# Patient Record
Sex: FEMALE | Race: WHITE | NOT HISPANIC OR LATINO | URBAN - METROPOLITAN AREA
[De-identification: names, ages, dates, MRNs, and addresses within clinical notes are randomized per-mention and may not be internally consistent; named-entity substitution may affect disease eponyms.]

---

## 2017-01-04 ENCOUNTER — APPOINTMENT (RX ONLY)
Dept: URBAN - METROPOLITAN AREA TELEMEDICINE 2 | Facility: TELEMEDICINE | Age: 47
Setting detail: DERMATOLOGY
End: 2017-01-04

## 2017-01-04 DIAGNOSIS — Z41.9 ENCOUNTER FOR PROCEDURE FOR PURPOSES OTHER THAN REMEDYING HEALTH STATE, UNSPECIFIED: ICD-10-CM

## 2017-01-04 PROCEDURE — ? COSMETIC CONSULTATION - PHOTOREJUVENATION

## 2017-01-04 ASSESSMENT — LOCATION ZONE DERM: LOCATION ZONE: FACE

## 2017-01-04 ASSESSMENT — LOCATION SIMPLE DESCRIPTION DERM: LOCATION SIMPLE: LEFT CHEEK

## 2017-01-04 ASSESSMENT — LOCATION DETAILED DESCRIPTION DERM: LOCATION DETAILED: LEFT INFERIOR CENTRAL MALAR CHEEK

## 2017-01-04 NOTE — HPI: FACE (AGING FACE)
How Severe Is It?: moderate
Is This A New Presentation, Or A Follow-Up?: Aging Face
Additional History: No autoimmune,hiv,hsv,hepatitis,diabetes,pregnant or breast feeding

## 2017-02-23 ENCOUNTER — APPOINTMENT (RX ONLY)
Dept: URBAN - METROPOLITAN AREA TELEMEDICINE 2 | Facility: TELEMEDICINE | Age: 47
Setting detail: DERMATOLOGY
End: 2017-02-23

## 2017-02-23 DIAGNOSIS — Z41.9 ENCOUNTER FOR PROCEDURE FOR PURPOSES OTHER THAN REMEDYING HEALTH STATE, UNSPECIFIED: ICD-10-CM

## 2017-02-23 PROCEDURE — ? SCITON BBL

## 2017-02-23 ASSESSMENT — LOCATION SIMPLE DESCRIPTION DERM: LOCATION SIMPLE: LEFT CHEEK

## 2017-02-23 ASSESSMENT — LOCATION DETAILED DESCRIPTION DERM: LOCATION DETAILED: LEFT INFERIOR CENTRAL MALAR CHEEK

## 2017-02-23 ASSESSMENT — LOCATION ZONE DERM: LOCATION ZONE: FACE

## 2017-02-23 NOTE — PROCEDURE: SCITON BBL
Fluence (J/Cm2): 9
Cooling (In C): 15
Repetition Rate (Hz): 10
Spot Size: Finesse Adapter Size: 15 x 15 mm square
Pulse Duration Units: milliseconds
Fluence (J/Cm2): 8
Passes: 1
Anesthesia Volume In Cc: 0
Fluence (J/Cm2): 25
Spot Size: Finesse Adapter Size: 7 mm round
Fluence (J/Cm2): 20
Cooling ?: Yes
Spot Size: Finesse Adapter Size: 15 x 45 mm (No Finesse Adapter)
Consent: Written consent obtained, risks reviewed including but not limited to crusting, scabbing, blistering, scarring, darker or lighter pigmentary change, bruising, and/or incomplete response.
Detail Level: Zone
Post Procedure Text: The patient tolerated the procedure well. Ice-chilled washclothes were applied to the treatment area for comfort. Post care was reviewd with the paitent.
Preprocedure Text: The treatment areas were thoroughly cleaned. Any exposed at risk hair that was not to be treated was covered in white paper tape. Clear ultrasound gel was applied to the treatment area. The area was treated with no immediate stacking of pulses.
Post-Care Instructions: I reviewed with the patient in detail post-care instructions. Patient should stay away from the sun and wear sun protection until treated areas are fully healed.

## 2020-12-14 ENCOUNTER — APPOINTMENT (RX ONLY)
Dept: URBAN - METROPOLITAN AREA TELEMEDICINE 2 | Facility: TELEMEDICINE | Age: 50
Setting detail: DERMATOLOGY
End: 2020-12-14

## 2020-12-14 DIAGNOSIS — H01.13 ECZEMATOUS DERMATITIS OF EYELID: ICD-10-CM | Status: INADEQUATELY CONTROLLED

## 2020-12-14 DIAGNOSIS — Z41.9 ENCOUNTER FOR PROCEDURE FOR PURPOSES OTHER THAN REMEDYING HEALTH STATE, UNSPECIFIED: ICD-10-CM

## 2020-12-14 PROBLEM — H01.139 ECZEMATOUS DERMATITIS OF UNSPECIFIED EYE, UNSPECIFIED EYELID: Status: ACTIVE | Noted: 2020-12-14

## 2020-12-14 PROBLEM — H01.134 ECZEMATOUS DERMATITIS OF LEFT UPPER EYELID: Status: ACTIVE | Noted: 2020-12-14

## 2020-12-14 PROBLEM — H01.131 ECZEMATOUS DERMATITIS OF RIGHT UPPER EYELID: Status: ACTIVE | Noted: 2020-12-14

## 2020-12-14 PROCEDURE — ? COSMETIC CONSULTATION: SKIN CARE PRODUCTS AND SERVICES

## 2020-12-14 PROCEDURE — ? PATIENT SPECIFIC COUNSELING

## 2020-12-14 PROCEDURE — ? TREATMENT REGIMEN

## 2020-12-14 PROCEDURE — ? PRESCRIPTION

## 2020-12-14 PROCEDURE — 99202 OFFICE O/P NEW SF 15 MIN: CPT

## 2020-12-14 PROCEDURE — ? COUNSELING

## 2020-12-14 RX ORDER — EMOLLIENT COMBINATION NO.35
THIN LAYER CREAM (GRAM) TOPICAL TWICE DAILY
Qty: 1 | Refills: 2 | Status: ERX | COMMUNITY
Start: 2020-12-14

## 2020-12-14 RX ORDER — CLOCORTOLONE PIVALATE 0 G/G
THIN LAYER CREAM TOPICAL BID
Qty: 1 | Refills: 0 | Status: ERX | COMMUNITY
Start: 2020-12-14

## 2020-12-14 RX ADMIN — Medication THIN LAYER: at 00:00

## 2020-12-14 RX ADMIN — CLOCORTOLONE PIVALATE THIN LAYER: 0 CREAM TOPICAL at 00:00

## 2020-12-14 ASSESSMENT — LOCATION DETAILED DESCRIPTION DERM
LOCATION DETAILED: LEFT MEDIAL SUPERIOR EYELID
LOCATION DETAILED: RIGHT SUPERIOR MEDIAL MALAR CHEEK
LOCATION DETAILED: RIGHT MEDIAL SUPERIOR EYELID
LOCATION DETAILED: RIGHT SUPERIOR MEDIAL MALAR CHEEK
LOCATION DETAILED: LEFT SUPERIOR MEDIAL MALAR CHEEK

## 2020-12-14 ASSESSMENT — LOCATION ZONE DERM
LOCATION ZONE: EYELID
LOCATION ZONE: FACE
LOCATION ZONE: FACE

## 2020-12-14 ASSESSMENT — SEVERITY ASSESSMENT: SEVERITY: MODERATE

## 2020-12-14 ASSESSMENT — LOCATION SIMPLE DESCRIPTION DERM
LOCATION SIMPLE: LEFT SUPERIOR EYELID
LOCATION SIMPLE: LEFT CHEEK
LOCATION SIMPLE: RIGHT CHEEK
LOCATION SIMPLE: RIGHT SUPERIOR EYELID
LOCATION SIMPLE: RIGHT CHEEK

## 2020-12-14 NOTE — PROCEDURE: TREATMENT REGIMEN
Detail Level: Zone
Plan: Consider patch testing at follow up if still flaring
Samples Given: Cloderm given \\nMimyx given
Initiate Treatment: Cloderm twice daily\\nMimyx twice daily for maintenance

## 2021-01-04 ENCOUNTER — APPOINTMENT (RX ONLY)
Dept: URBAN - METROPOLITAN AREA TELEMEDICINE 2 | Facility: TELEMEDICINE | Age: 51
Setting detail: DERMATOLOGY
End: 2021-01-04

## 2021-01-04 DIAGNOSIS — H01.13 ECZEMATOUS DERMATITIS OF EYELID: ICD-10-CM

## 2021-01-04 DIAGNOSIS — L90.8 OTHER ATROPHIC DISORDERS OF SKIN: ICD-10-CM

## 2021-01-04 DIAGNOSIS — L57.3 POIKILODERMA OF CIVATTE: ICD-10-CM | Status: WORSENING

## 2021-01-04 PROBLEM — H01.139 ECZEMATOUS DERMATITIS OF UNSPECIFIED EYE, UNSPECIFIED EYELID: Status: ACTIVE | Noted: 2021-01-04

## 2021-01-04 PROBLEM — H01.134 ECZEMATOUS DERMATITIS OF LEFT UPPER EYELID: Status: ACTIVE | Noted: 2021-01-04

## 2021-01-04 PROBLEM — H01.131 ECZEMATOUS DERMATITIS OF RIGHT UPPER EYELID: Status: ACTIVE | Noted: 2021-01-04

## 2021-01-04 PROCEDURE — ? PATIENT SPECIFIC COUNSELING

## 2021-01-04 PROCEDURE — ? COUNSELING

## 2021-01-04 PROCEDURE — 99213 OFFICE O/P EST LOW 20 MIN: CPT

## 2021-01-04 PROCEDURE — ? RECOMMENDATIONS

## 2021-01-04 PROCEDURE — ? TREATMENT REGIMEN

## 2021-01-04 ASSESSMENT — LOCATION SIMPLE DESCRIPTION DERM
LOCATION SIMPLE: LEFT CHEEK
LOCATION SIMPLE: RIGHT CHEEK
LOCATION SIMPLE: LEFT SUPERIOR EYELID
LOCATION SIMPLE: RIGHT SUPERIOR EYELID
LOCATION SIMPLE: RIGHT ANTERIOR NECK

## 2021-01-04 ASSESSMENT — LOCATION ZONE DERM
LOCATION ZONE: FACE
LOCATION ZONE: NECK
LOCATION ZONE: EYELID

## 2021-01-04 ASSESSMENT — LOCATION DETAILED DESCRIPTION DERM
LOCATION DETAILED: RIGHT SUPERIOR MEDIAL MALAR CHEEK
LOCATION DETAILED: RIGHT INFERIOR ANTERIOR NECK
LOCATION DETAILED: RIGHT MEDIAL SUPERIOR EYELID
LOCATION DETAILED: LEFT SUPERIOR MEDIAL MALAR CHEEK
LOCATION DETAILED: LEFT MEDIAL SUPERIOR EYELID

## 2021-01-04 NOTE — PROCEDURE: TREATMENT REGIMEN
Detail Level: Zone
Plan: Consider patch testing at follow up if still flaring, Patient defers today \\nRecommended moisturizer over the top if dry
Continue Regimen: Cloderm twice daily for one week max per month prn\\nMimyx twice daily every day for maintenance
Otc Regimen: SPF daily

## 2021-01-04 NOTE — PROCEDURE: RECOMMENDATIONS
Recommendations (Free Text): Profrac tx with Osvaldo (okay per Angie once eyelid derm calm)
Detail Level: Simple
Render Risk Assessment In Note?: no
Recommendation Preamble: The following recommendations were made during the visit:

## 2024-04-01 ENCOUNTER — APPOINTMENT (RX ONLY)
Dept: URBAN - METROPOLITAN AREA TELEMEDICINE 2 | Facility: TELEMEDICINE | Age: 54
Setting detail: DERMATOLOGY
End: 2024-04-01

## 2024-04-01 DIAGNOSIS — D18.0 HEMANGIOMA: ICD-10-CM

## 2024-04-01 DIAGNOSIS — Q80.8 OTHER CONGENITAL ICHTHYOSIS: ICD-10-CM

## 2024-04-01 DIAGNOSIS — Q828 OTHER SPECIFIED ANOMALIES OF SKIN: ICD-10-CM

## 2024-04-01 DIAGNOSIS — I78.1 NEVUS, NON-NEOPLASTIC: ICD-10-CM

## 2024-04-01 DIAGNOSIS — L40.0 PSORIASIS VULGARIS: ICD-10-CM | Status: INADEQUATELY CONTROLLED

## 2024-04-01 DIAGNOSIS — L57.3 POIKILODERMA OF CIVATTE: ICD-10-CM

## 2024-04-01 DIAGNOSIS — D22 MELANOCYTIC NEVI: ICD-10-CM

## 2024-04-01 DIAGNOSIS — Z71.89 OTHER SPECIFIED COUNSELING: ICD-10-CM

## 2024-04-01 DIAGNOSIS — Q826 OTHER SPECIFIED ANOMALIES OF SKIN: ICD-10-CM

## 2024-04-01 DIAGNOSIS — L81.4 OTHER MELANIN HYPERPIGMENTATION: ICD-10-CM

## 2024-04-01 DIAGNOSIS — L82.1 OTHER SEBORRHEIC KERATOSIS: ICD-10-CM

## 2024-04-01 DIAGNOSIS — D485 NEOPLASM OF UNCERTAIN BEHAVIOR OF SKIN: ICD-10-CM

## 2024-04-01 DIAGNOSIS — Q819 OTHER SPECIFIED ANOMALIES OF SKIN: ICD-10-CM

## 2024-04-01 DIAGNOSIS — L57.0 ACTINIC KERATOSIS: ICD-10-CM

## 2024-04-01 PROBLEM — D22.71 MELANOCYTIC NEVI OF RIGHT LOWER LIMB, INCLUDING HIP: Status: ACTIVE | Noted: 2024-04-01

## 2024-04-01 PROBLEM — D22.72 MELANOCYTIC NEVI OF LEFT LOWER LIMB, INCLUDING HIP: Status: ACTIVE | Noted: 2024-04-01

## 2024-04-01 PROBLEM — D48.5 NEOPLASM OF UNCERTAIN BEHAVIOR OF SKIN: Status: ACTIVE | Noted: 2024-04-01

## 2024-04-01 PROBLEM — D23.71 OTHER BENIGN NEOPLASM OF SKIN OF RIGHT LOWER LIMB, INCLUDING HIP: Status: ACTIVE | Noted: 2024-04-01

## 2024-04-01 PROBLEM — D18.01 HEMANGIOMA OF SKIN AND SUBCUTANEOUS TISSUE: Status: ACTIVE | Noted: 2024-04-01

## 2024-04-01 PROBLEM — D22.39 MELANOCYTIC NEVI OF OTHER PARTS OF FACE: Status: ACTIVE | Noted: 2024-04-01

## 2024-04-01 PROBLEM — L85.8 OTHER SPECIFIED EPIDERMAL THICKENING: Status: ACTIVE | Noted: 2024-04-01

## 2024-04-01 PROBLEM — D22.5 MELANOCYTIC NEVI OF TRUNK: Status: ACTIVE | Noted: 2024-04-01

## 2024-04-01 PROCEDURE — ? LIQUID NITROGEN

## 2024-04-01 PROCEDURE — ? PRESCRIPTION

## 2024-04-01 PROCEDURE — ? BIOPSY BY SHAVE METHOD

## 2024-04-01 PROCEDURE — 11103 TANGNTL BX SKIN EA SEP/ADDL: CPT

## 2024-04-01 PROCEDURE — ? PRESCRIPTION MEDICATION MANAGEMENT

## 2024-04-01 PROCEDURE — 11102 TANGNTL BX SKIN SINGLE LES: CPT

## 2024-04-01 PROCEDURE — ? COUNSELING

## 2024-04-01 PROCEDURE — 99204 OFFICE O/P NEW MOD 45 MIN: CPT | Mod: 25

## 2024-04-01 PROCEDURE — 17000 DESTRUCT PREMALG LESION: CPT | Mod: 59

## 2024-04-01 RX ORDER — ROFLUMILAST 3 MG/G
THIN LAYER CREAM TOPICAL
Qty: 60 | Refills: 3 | Status: ERX | COMMUNITY
Start: 2024-04-01

## 2024-04-01 RX ADMIN — ROFLUMILAST THIN LAYER: 3 CREAM TOPICAL at 00:00

## 2024-04-01 ASSESSMENT — LOCATION ZONE DERM
LOCATION ZONE: NECK
LOCATION ZONE: EYELID
LOCATION ZONE: ARM
LOCATION ZONE: LEG
LOCATION ZONE: FACE
LOCATION ZONE: TRUNK

## 2024-04-01 ASSESSMENT — LOCATION SIMPLE DESCRIPTION DERM
LOCATION SIMPLE: RIGHT UPPER BACK
LOCATION SIMPLE: RIGHT POSTERIOR UPPER ARM
LOCATION SIMPLE: LEFT ZYGOMA
LOCATION SIMPLE: RIGHT BUTTOCK
LOCATION SIMPLE: RIGHT CHEEK
LOCATION SIMPLE: RIGHT ANKLE
LOCATION SIMPLE: RIGHT SUPERIOR EYELID
LOCATION SIMPLE: LEFT BUTTOCK
LOCATION SIMPLE: LEFT THIGH
LOCATION SIMPLE: LEFT CALF
LOCATION SIMPLE: LEFT FOREARM
LOCATION SIMPLE: RIGHT FOREARM
LOCATION SIMPLE: RIGHT ANTERIOR NECK
LOCATION SIMPLE: RIGHT TEMPLE
LOCATION SIMPLE: LEFT POSTERIOR UPPER ARM
LOCATION SIMPLE: ABDOMEN
LOCATION SIMPLE: RIGHT THIGH
LOCATION SIMPLE: LEFT POSTERIOR THIGH
LOCATION SIMPLE: RIGHT CALF
LOCATION SIMPLE: LEFT CHEEK
LOCATION SIMPLE: LEFT SUPERIOR EYELID
LOCATION SIMPLE: POSTERIOR NECK
LOCATION SIMPLE: CHEST
LOCATION SIMPLE: LEFT UPPER BACK
LOCATION SIMPLE: UPPER BACK
LOCATION SIMPLE: RIGHT POSTERIOR THIGH

## 2024-04-01 ASSESSMENT — LOCATION DETAILED DESCRIPTION DERM
LOCATION DETAILED: RIGHT SUPERIOR MEDIAL MALAR CHEEK
LOCATION DETAILED: LEFT SUPERIOR MEDIAL UPPER BACK
LOCATION DETAILED: RIGHT PROXIMAL POSTERIOR THIGH
LOCATION DETAILED: LEFT PROXIMAL POSTERIOR THIGH
LOCATION DETAILED: LEFT ANTERIOR PROXIMAL THIGH
LOCATION DETAILED: RIGHT INFERIOR LATERAL MALAR CHEEK
LOCATION DETAILED: RIGHT BUTTOCK
LOCATION DETAILED: LEFT LATERAL ZYGOMA
LOCATION DETAILED: RIGHT MEDIAL SUPERIOR EYELID
LOCATION DETAILED: LEFT PROXIMAL DORSAL FOREARM
LOCATION DETAILED: LEFT DISTAL POSTERIOR THIGH
LOCATION DETAILED: LEFT PROXIMAL POSTERIOR UPPER ARM
LOCATION DETAILED: RIGHT POSTERIOR ANKLE
LOCATION DETAILED: RIGHT LATERAL ABDOMEN
LOCATION DETAILED: LEFT MEDIAL MALAR CHEEK
LOCATION DETAILED: LEFT BUTTOCK
LOCATION DETAILED: LEFT DISTAL CALF
LOCATION DETAILED: RIGHT INFERIOR ANTERIOR NECK
LOCATION DETAILED: RIGHT LATERAL TEMPLE
LOCATION DETAILED: LEFT SUPERIOR MEDIAL MALAR CHEEK
LOCATION DETAILED: RIGHT SUPERIOR MEDIAL UPPER BACK
LOCATION DETAILED: LEFT MEDIAL SUPERIOR EYELID
LOCATION DETAILED: UPPER STERNUM
LOCATION DETAILED: RIGHT MEDIAL TRAPEZIAL NECK
LOCATION DETAILED: RIGHT DISTAL POSTERIOR UPPER ARM
LOCATION DETAILED: EPIGASTRIC SKIN
LOCATION DETAILED: LEFT MID-UPPER BACK
LOCATION DETAILED: RIGHT PROXIMAL DORSAL FOREARM
LOCATION DETAILED: RIGHT PROXIMAL POSTERIOR UPPER ARM
LOCATION DETAILED: MIDDLE STERNUM
LOCATION DETAILED: RIGHT DISTAL CALF
LOCATION DETAILED: INFERIOR THORACIC SPINE
LOCATION DETAILED: LEFT RIB CAGE
LOCATION DETAILED: LEFT DISTAL POSTERIOR UPPER ARM
LOCATION DETAILED: LOWER STERNUM
LOCATION DETAILED: RIGHT ANTERIOR PROXIMAL THIGH

## 2024-04-01 ASSESSMENT — PGA PSORIASIS: PGA PSORIASIS 2020: MODERATE

## 2024-04-01 ASSESSMENT — ITCH NUMERIC RATING SCALE: HOW SEVERE IS YOUR ITCHING?: 2

## 2024-04-01 ASSESSMENT — BSA PSORIASIS: % BODY COVERED IN PSORIASIS: 1

## 2024-04-01 NOTE — PROCEDURE: BIOPSY BY SHAVE METHOD
Detail Level: Detailed
Depth Of Biopsy: dermis
Was A Bandage Applied: Yes
Size Of Lesion In Cm: 0
Biopsy Type: H and E
Biopsy Method: Dermablade
Anesthesia Type: 1% lidocaine with epinephrine and a 1:10 solution of 8.4% sodium bicarbonate
Anesthesia Volume In Cc: 0.5
Hemostasis: Drysol
Wound Care: Vaseline
Dressing: bandage
Destruction After The Procedure: No
Type Of Destruction Used: Curettage
Curettage Text: The wound bed was treated with curettage after the biopsy was performed.
Cryotherapy Text: The wound bed was treated with cryotherapy after the biopsy was performed.
Electrodesiccation Text: The wound bed was treated with electrodesiccation after the biopsy was performed.
Electrodesiccation And Curettage Text: The wound bed was treated with electrodesiccation and curettage after the biopsy was performed.
Silver Nitrate Text: The wound bed was treated with silver nitrate after the biopsy was performed.
Lab: [id number]
Lab Facility: [id number]
Consent: Written consent was obtained and risks were reviewed including but not limited to scarring, infection, bleeding, scabbing, incomplete removal, nerve damage and allergy to anesthesia.
Post-Care Instructions: I reviewed with the patient in detail post-care instructions. Patient is to keep the biopsy site dry overnight, and then apply bacitracin twice daily until healed. Patient may apply hydrogen peroxide soaks to remove any crusting.
Notification Instructions: Patient will be notified of biopsy results. However, patient instructed to call the office if not contacted within 2 weeks.
Billing Type: Third-Party Bill
Information: Selecting Yes will display possible errors in your note based on the variables you have selected. This validation is only offered as a suggestion for you. PLEASE NOTE THAT THE VALIDATION TEXT WILL BE REMOVED WHEN YOU FINALIZE YOUR NOTE. IF YOU WANT TO FAX A PRELIMINARY NOTE YOU WILL NEED TO TOGGLE THIS TO 'NO' IF YOU DO NOT WANT IT IN YOUR FAXED NOTE.

## 2024-04-01 NOTE — PROCEDURE: LIQUID NITROGEN
Render Post-Care Instructions In Note?: yes
Consent: The patient's consent was obtained including but not limited to risks of crusting, scabbing, blistering, scarring, darker or lighter pigmentary change, recurrence, incomplete removal and infection.
Detail Level: Zone
Number Of Freeze-Thaw Cycles: 3 freeze-thaw cycles
Render Note In Bullet Format When Appropriate: No
Post-Care Instructions: I reviewed with the patient in detail post-care instructions. Patient is to wear sunprotection, and avoid picking at any of the treated lesions. Pt may apply Vaseline to crusted or scabbing areas.
Duration Of Freeze Thaw-Cycle (Seconds): 5

## 2024-04-01 NOTE — PROCEDURE: PRESCRIPTION MEDICATION MANAGEMENT
Samples Given: Zoryve
Detail Level: Zone
Render In Strict Bullet Format?: No
Initiate Treatment: Zoryve ( [place])

## 2025-04-28 ENCOUNTER — APPOINTMENT (OUTPATIENT)
Dept: URBAN - METROPOLITAN AREA TELEMEDICINE 2 | Facility: TELEMEDICINE | Age: 55
Setting detail: DERMATOLOGY
End: 2025-04-28

## 2025-04-28 DIAGNOSIS — D22 MELANOCYTIC NEVI: ICD-10-CM

## 2025-04-28 DIAGNOSIS — I78.1 NEVUS, NON-NEOPLASTIC: ICD-10-CM

## 2025-04-28 DIAGNOSIS — Z71.89 OTHER SPECIFIED COUNSELING: ICD-10-CM

## 2025-04-28 DIAGNOSIS — D18.0 HEMANGIOMA: ICD-10-CM

## 2025-04-28 DIAGNOSIS — L81.4 OTHER MELANIN HYPERPIGMENTATION: ICD-10-CM

## 2025-04-28 DIAGNOSIS — Q819 OTHER SPECIFIED ANOMALIES OF SKIN: ICD-10-CM

## 2025-04-28 DIAGNOSIS — Q826 OTHER SPECIFIED ANOMALIES OF SKIN: ICD-10-CM

## 2025-04-28 DIAGNOSIS — Q828 OTHER SPECIFIED ANOMALIES OF SKIN: ICD-10-CM

## 2025-04-28 DIAGNOSIS — L40.0 PSORIASIS VULGARIS: ICD-10-CM | Status: INADEQUATELY CONTROLLED

## 2025-04-28 DIAGNOSIS — Q80.8 OTHER CONGENITAL ICHTHYOSIS: ICD-10-CM

## 2025-04-28 DIAGNOSIS — L57.0 ACTINIC KERATOSIS: ICD-10-CM | Status: INADEQUATELY CONTROLLED

## 2025-04-28 DIAGNOSIS — L57.3 POIKILODERMA OF CIVATTE: ICD-10-CM

## 2025-04-28 DIAGNOSIS — L82.1 OTHER SEBORRHEIC KERATOSIS: ICD-10-CM

## 2025-04-28 PROBLEM — D22.72 MELANOCYTIC NEVI OF LEFT LOWER LIMB, INCLUDING HIP: Status: ACTIVE | Noted: 2025-04-28

## 2025-04-28 PROBLEM — D22.39 MELANOCYTIC NEVI OF OTHER PARTS OF FACE: Status: ACTIVE | Noted: 2025-04-28

## 2025-04-28 PROBLEM — D23.71 OTHER BENIGN NEOPLASM OF SKIN OF RIGHT LOWER LIMB, INCLUDING HIP: Status: ACTIVE | Noted: 2025-04-28

## 2025-04-28 PROBLEM — D22.5 MELANOCYTIC NEVI OF TRUNK: Status: ACTIVE | Noted: 2025-04-28

## 2025-04-28 PROBLEM — L85.8 OTHER SPECIFIED EPIDERMAL THICKENING: Status: ACTIVE | Noted: 2025-04-28

## 2025-04-28 PROBLEM — D18.01 HEMANGIOMA OF SKIN AND SUBCUTANEOUS TISSUE: Status: ACTIVE | Noted: 2025-04-28

## 2025-04-28 PROBLEM — D22.71 MELANOCYTIC NEVI OF RIGHT LOWER LIMB, INCLUDING HIP: Status: ACTIVE | Noted: 2025-04-28

## 2025-04-28 PROCEDURE — 17003 DESTRUCT PREMALG LES 2-14: CPT

## 2025-04-28 PROCEDURE — 99214 OFFICE O/P EST MOD 30 MIN: CPT | Mod: 25

## 2025-04-28 PROCEDURE — ? PRESCRIPTION

## 2025-04-28 PROCEDURE — ? SUNSCREEN RECOMMENDATIONS

## 2025-04-28 PROCEDURE — ? LIQUID NITROGEN

## 2025-04-28 PROCEDURE — 17000 DESTRUCT PREMALG LESION: CPT

## 2025-04-28 PROCEDURE — ? COUNSELING

## 2025-04-28 PROCEDURE — ? PRESCRIPTION MEDICATION MANAGEMENT

## 2025-04-28 RX ORDER — TAPINAROF 10 MG/1000MG
CREAM TOPICAL QD
Qty: 60 | Refills: 2 | Status: ERX | COMMUNITY
Start: 2025-04-28

## 2025-04-28 RX ADMIN — TAPINAROF: 10 CREAM TOPICAL at 00:00

## 2025-04-28 ASSESSMENT — LOCATION SIMPLE DESCRIPTION DERM
LOCATION SIMPLE: RIGHT TEMPLE
LOCATION SIMPLE: LEFT ZYGOMA
LOCATION SIMPLE: LEFT FOREARM
LOCATION SIMPLE: LEFT SUPERIOR EYELID
LOCATION SIMPLE: RIGHT FOREARM
LOCATION SIMPLE: LEFT UPPER BACK
LOCATION SIMPLE: LEFT CALF
LOCATION SIMPLE: CHEST
LOCATION SIMPLE: RIGHT ANKLE
LOCATION SIMPLE: RIGHT BUTTOCK
LOCATION SIMPLE: RIGHT ANTERIOR NECK
LOCATION SIMPLE: UPPER BACK
LOCATION SIMPLE: ABDOMEN
LOCATION SIMPLE: RIGHT POSTERIOR UPPER ARM
LOCATION SIMPLE: RIGHT SUPERIOR EYELID
LOCATION SIMPLE: RIGHT UPPER BACK
LOCATION SIMPLE: RIGHT CALF
LOCATION SIMPLE: RIGHT POSTERIOR THIGH
LOCATION SIMPLE: LEFT CHEEK
LOCATION SIMPLE: RIGHT CHEEK
LOCATION SIMPLE: LEFT POSTERIOR UPPER ARM
LOCATION SIMPLE: LEFT CLAVICULAR SKIN
LOCATION SIMPLE: RIGHT THIGH
LOCATION SIMPLE: LEFT POSTERIOR THIGH
LOCATION SIMPLE: LEFT BUTTOCK

## 2025-04-28 ASSESSMENT — LOCATION DETAILED DESCRIPTION DERM
LOCATION DETAILED: LEFT DISTAL POSTERIOR UPPER ARM
LOCATION DETAILED: RIGHT LATERAL ABDOMEN
LOCATION DETAILED: LEFT LATERAL ZYGOMA
LOCATION DETAILED: RIGHT SUPERIOR MEDIAL UPPER BACK
LOCATION DETAILED: LOWER STERNUM
LOCATION DETAILED: LEFT SUPERIOR MEDIAL UPPER BACK
LOCATION DETAILED: LEFT PROXIMAL POSTERIOR UPPER ARM
LOCATION DETAILED: LEFT BUTTOCK
LOCATION DETAILED: RIGHT LATERAL TEMPLE
LOCATION DETAILED: EPIGASTRIC SKIN
LOCATION DETAILED: RIGHT MEDIAL SUPERIOR EYELID
LOCATION DETAILED: RIGHT INFERIOR ANTERIOR NECK
LOCATION DETAILED: RIGHT BUTTOCK
LOCATION DETAILED: RIGHT DISTAL DORSAL FOREARM
LOCATION DETAILED: LEFT RIB CAGE
LOCATION DETAILED: LEFT MEDIAL MALAR CHEEK
LOCATION DETAILED: RIGHT SUPERIOR MEDIAL MALAR CHEEK
LOCATION DETAILED: RIGHT PROXIMAL DORSAL FOREARM
LOCATION DETAILED: MIDDLE STERNUM
LOCATION DETAILED: LEFT CLAVICULAR SKIN
LOCATION DETAILED: RIGHT PROXIMAL POSTERIOR THIGH
LOCATION DETAILED: RIGHT POSTERIOR ANKLE
LOCATION DETAILED: LEFT SUPERIOR MEDIAL MALAR CHEEK
LOCATION DETAILED: UPPER STERNUM
LOCATION DETAILED: LEFT MID-UPPER BACK
LOCATION DETAILED: RIGHT DISTAL POSTERIOR UPPER ARM
LOCATION DETAILED: INFERIOR THORACIC SPINE
LOCATION DETAILED: LEFT MEDIAL SUPERIOR EYELID
LOCATION DETAILED: RIGHT DISTAL CALF
LOCATION DETAILED: LEFT PROXIMAL DORSAL FOREARM
LOCATION DETAILED: RIGHT PROXIMAL POSTERIOR UPPER ARM
LOCATION DETAILED: LEFT DISTAL CALF
LOCATION DETAILED: LEFT MEDIAL SUPERIOR CHEST
LOCATION DETAILED: LEFT DISTAL POSTERIOR THIGH
LOCATION DETAILED: LEFT DISTAL DORSAL FOREARM
LOCATION DETAILED: RIGHT INFERIOR LATERAL MALAR CHEEK
LOCATION DETAILED: LEFT PROXIMAL POSTERIOR THIGH
LOCATION DETAILED: RIGHT ANTERIOR PROXIMAL THIGH

## 2025-04-28 ASSESSMENT — LOCATION ZONE DERM
LOCATION ZONE: FACE
LOCATION ZONE: ARM
LOCATION ZONE: TRUNK
LOCATION ZONE: LEG
LOCATION ZONE: EYELID
LOCATION ZONE: NECK

## 2025-04-28 ASSESSMENT — PGA PSORIASIS: PGA PSORIASIS 2020: MILD

## 2025-04-28 ASSESSMENT — ITCH NUMERIC RATING SCALE: HOW SEVERE IS YOUR ITCHING?: 0

## 2025-04-28 ASSESSMENT — BSA PSORIASIS: % BODY COVERED IN PSORIASIS: 2

## 2025-05-07 ENCOUNTER — RX ONLY (RX ONLY)
Age: 55
End: 2025-05-07

## 2025-05-07 RX ORDER — DESONIDE 0.5 MG/G
1 CREAM TOPICAL TWICE DAILY
Qty: 15 | Refills: 1 | Status: ERX | COMMUNITY
Start: 2025-05-07